# Patient Record
Sex: MALE | Race: WHITE | Employment: STUDENT | URBAN - METROPOLITAN AREA
[De-identification: names, ages, dates, MRNs, and addresses within clinical notes are randomized per-mention and may not be internally consistent; named-entity substitution may affect disease eponyms.]

---
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
## 2022-10-30 ENCOUNTER — APPOINTMENT (OUTPATIENT)
Dept: CT IMAGING | Age: 22
End: 2022-10-30
Payer: COMMERCIAL

## 2022-10-30 ENCOUNTER — HOSPITAL ENCOUNTER (EMERGENCY)
Age: 22
Discharge: HOME | End: 2022-10-30
Attending: STUDENT IN AN ORGANIZED HEALTH CARE EDUCATION/TRAINING PROGRAM
Payer: COMMERCIAL

## 2022-10-30 VITALS
SYSTOLIC BLOOD PRESSURE: 117 MMHG | HEART RATE: 50 BPM | OXYGEN SATURATION: 99 % | RESPIRATION RATE: 16 BRPM | WEIGHT: 160 LBS | BODY MASS INDEX: 22.9 KG/M2 | TEMPERATURE: 98 F | DIASTOLIC BLOOD PRESSURE: 71 MMHG | HEIGHT: 70 IN

## 2022-10-30 DIAGNOSIS — H53.9 VISUAL CHANGES: Primary | ICD-10-CM

## 2022-10-30 LAB
ANION GAP SERPL CALCULATED.3IONS-SCNC: 9 MMOL/L (ref 4–16)
BASOPHILS ABSOLUTE: 0.1 K/CU MM
BASOPHILS RELATIVE PERCENT: 0.8 % (ref 0–1)
BUN BLDV-MCNC: 13 MG/DL (ref 6–23)
CALCIUM SERPL-MCNC: 10 MG/DL (ref 8.3–10.6)
CHLORIDE BLD-SCNC: 101 MMOL/L (ref 99–110)
CHP ED QC CHECK: NORMAL
CO2: 29 MMOL/L (ref 21–32)
CREAT SERPL-MCNC: 1 MG/DL (ref 0.9–1.3)
DIFFERENTIAL TYPE: ABNORMAL
EOSINOPHILS ABSOLUTE: 0.7 K/CU MM
EOSINOPHILS RELATIVE PERCENT: 10.2 % (ref 0–3)
GFR SERPL CREATININE-BSD FRML MDRD: >60 ML/MIN/1.73M2
GLUCOSE BLD-MCNC: 103 MG/DL
GLUCOSE BLD-MCNC: 103 MG/DL (ref 70–99)
GLUCOSE BLD-MCNC: 106 MG/DL (ref 70–99)
HCT VFR BLD CALC: 48.4 % (ref 42–52)
HEMOGLOBIN: 16.1 GM/DL (ref 13.5–18)
IMMATURE NEUTROPHIL %: 0.2 % (ref 0–0.43)
LYMPHOCYTES ABSOLUTE: 2 K/CU MM
LYMPHOCYTES RELATIVE PERCENT: 31.6 % (ref 24–44)
MCH RBC QN AUTO: 29.1 PG (ref 27–31)
MCHC RBC AUTO-ENTMCNC: 33.3 % (ref 32–36)
MCV RBC AUTO: 87.4 FL (ref 78–100)
MONOCYTES ABSOLUTE: 0.6 K/CU MM
MONOCYTES RELATIVE PERCENT: 9.1 % (ref 0–4)
NUCLEATED RBC %: 0 %
PDW BLD-RTO: 12.5 % (ref 11.7–14.9)
PLATELET # BLD: 333 K/CU MM (ref 140–440)
PMV BLD AUTO: 9.1 FL (ref 7.5–11.1)
POTASSIUM SERPL-SCNC: 4.3 MMOL/L (ref 3.5–5.1)
RBC # BLD: 5.54 M/CU MM (ref 4.6–6.2)
SEGMENTED NEUTROPHILS ABSOLUTE COUNT: 3.1 K/CU MM
SEGMENTED NEUTROPHILS RELATIVE PERCENT: 48.1 % (ref 36–66)
SODIUM BLD-SCNC: 139 MMOL/L (ref 135–145)
TOTAL IMMATURE NEUTOROPHIL: 0.01 K/CU MM
TOTAL NUCLEATED RBC: 0 K/CU MM
WBC # BLD: 6.4 K/CU MM (ref 4–10.5)

## 2022-10-30 PROCEDURE — 85025 COMPLETE CBC W/AUTO DIFF WBC: CPT

## 2022-10-30 PROCEDURE — 80048 BASIC METABOLIC PNL TOTAL CA: CPT

## 2022-10-30 PROCEDURE — 70450 CT HEAD/BRAIN W/O DYE: CPT

## 2022-10-30 PROCEDURE — 6360000004 HC RX CONTRAST MEDICATION: Performed by: STUDENT IN AN ORGANIZED HEALTH CARE EDUCATION/TRAINING PROGRAM

## 2022-10-30 PROCEDURE — 82962 GLUCOSE BLOOD TEST: CPT

## 2022-10-30 PROCEDURE — 70496 CT ANGIOGRAPHY HEAD: CPT

## 2022-10-30 PROCEDURE — 99285 EMERGENCY DEPT VISIT HI MDM: CPT

## 2022-10-30 RX ADMIN — IOPAMIDOL 75 ML: 755 INJECTION, SOLUTION INTRAVENOUS at 13:09

## 2022-10-30 NOTE — ED PROVIDER NOTES
Emergency Department Encounter    Patient: Tonja Martines  MRN: [id number]  : 2000  Date of Evaluation: 10/30/2022  ED Provider:  Amelia Ragsdale DO    Triage Chief Complaint:   Loss of Vision (In rt eye since Monday 10/24/2022. Sent by urgent care)    [place]:  Tonja Martines is a 23 y.o. male with no significant past medical history that presents to the ED with a history of right eye vision loss. Patient said he happened on Monday while he was jogging and he had partial loss of vision in the right eye. Symptoms are resolved by the time patient comes to the ED. No history of headache, redness of the eye, trauma to the eye, eye discharge, pain in the eye, numbness, focal weakness. Patient does not have any symptom at this time. ROS - see HPI, below listed is current ROS at time of my eval:  General:  No fevers, no chills, no weakness  Eyes:  + recent vison changes, no discharge  ENT:  No sore throat, no nasal congestion, no hearing changes  Cardiovascular:  No chest pain, no palpitations  Respiratory:  No shortness of breath, no cough, no wheezing  Gastrointestinal:  No pain, no nausea, no vomiting, no diarrhea  Musculoskeletal:  No muscle pain, no joint pain  Skin:  No rash, no pruritis, no easy bruising  Neurologic:  No speech problems, no headache, no extremity numbness, no extremity tingling, no extremity weakness  Psychiatric:  No anxiety  Genitourinary:  No dysuria, no hematuria  Endocrine:  No unexpected weight gain, no unexpected weight loss  Extremities:  no edema, no pain    Past Medical History:   Diagnosis Date    Moderate persistent asthma     Seasonal allergies      History reviewed. No pertinent surgical history.   Family History   Problem Relation Age of Onset    High Cholesterol Father      Social History     Socioeconomic History    Marital status: Single     Spouse name: Not on file    Number of children: Not on file    Years of education: Not on file    Highest education level: Not on file   Occupational History    Not on file   Tobacco Use    Smoking status: Never    Smokeless tobacco: Never   Vaping Use    Vaping Use: Never used   Substance and Sexual Activity    Alcohol use: Not Currently    Drug use: Never    Sexual activity: Not Currently     Partners: Female   Other Topics Concern    Not on file   Social History Narrative    Not on file     Social Determinants of Health     Financial Resource Strain: Not on file   Food Insecurity: Not on file   Transportation Needs: Not on file   Physical Activity: Not on file   Stress: Not on file   Social Connections: Not on file   Intimate Partner Violence: Not on file   Housing Stability: Not on file     No current facility-administered medications for this encounter. Current Outpatient Medications   Medication Sig Dispense Refill    albuterol (PROVENTIL) (5 MG/ML) 0.5% nebulizer solution Take 2.5 mg by nebulization every 4-6 hours as needed      levalbuterol (XOPENEX HFA) 45 MCG/ACT inhaler Inhale 2 puffs into the lungs every 6 hours as needed      cetirizine (ZYRTEC) 10 MG tablet Take 10 mg by mouth daily      SYMBICORT 80-4.5 MCG/ACT AERO Take 2 puffs by mouth 2 times daily       Allergies   Allergen Reactions    Tree Nut [Macadamia Nut Oil] Other (See Comments)    Pcn [Penicillins]     [place] Oil     Amoxicillin-Pot Clavulanate     Peanut-Containing Drug Products Hives       Nursing Notes Reviewed    Physical Exam:  Triage VS:    ED Triage Vitals [10/30/22 1115]   Enc Vitals Group      /83      Heart Rate 58      Resp 16      Temp 98 °F (36.7 °C)      Temp Source Oral      SpO2 100 %      Weight 160 lb (72.6 kg)      Height 5' 10\" (1.778 m)      Head Circumference       Peak Flow       Pain Score       Pain Loc       Pain Edu? Excl. in [address]? My pulse ox interpretation is - normal    General appearance:  No acute distress. Skin:  Warm. Dry. Eye:  Extraocular movements intact.   No redness, swelling around the eyes, discharge, itchiness, tearing, or tenderness around the eyes. Visual field intact bilaterally, visual acuity 20/16 with both eyes, 20/20 right eye, 20/20 left eye. Ears, nose, mouth and throat:  Oral mucosa moist   Neck:  Trachea midline. Extremity:  No swelling. Normal ROM     Heart:  Regular rate and rhythm, normal S1 & S2, no extra heart sounds. Perfusion:  intact  Respiratory:  Lungs clear to auscultation bilaterally. Respirations nonlabored. Abdominal:  Normal bowel sounds. Soft. Nontender. Non distended. Back:  No CVA tenderness to palpation     Neurological:  Alert and oriented times 3. No focal neuro deficits.              Psychiatric:  Appropriate    I have reviewed and interpreted all of the currently available lab results from this visit (if applicable):  Results for orders placed or performed during the hospital encounter of 10/30/22   BMP   Result Value Ref Range    Sodium 139 135 - 145 MMOL/L    Potassium 4.3 3.5 - 5.1 MMOL/L    Chloride 101 99 - 110 mMol/L    CO2 29 21 - 32 MMOL/L    Anion Gap 9 4 - 16    BUN 13 6 - 23 MG/DL    Creatinine 1.0 0.9 - 1.3 MG/DL    Est, Glom Filt Rate >60 >60 mL/min/1.73m2    Glucose 106 (H) 70 - 99 MG/DL    Calcium 10.0 8.3 - 10.6 MG/DL   CBC with Auto Differential   Result Value Ref Range    WBC 6.4 4.0 - 10.5 K/CU MM    RBC 5.54 4.6 - 6.2 M/CU MM    Hemoglobin 16.1 13.5 - 18.0 GM/DL    Hematocrit 48.4 42 - 52 %    MCV 87.4 78 - 100 FL    MCH 29.1 27 - 31 PG    MCHC 33.3 32.0 - 36.0 %    RDW 12.5 11.7 - 14.9 %    Platelets 388 602 - 795 K/CU MM    MPV 9.1 7.5 - 11.1 FL    Differential Type AUTOMATED DIFFERENTIAL     Segs Relative 48.1 36 - 66 %    Lymphocytes % 31.6 24 - 44 %    Monocytes % 9.1 (H) 0 - 4 %    Eosinophils % 10.2 (H) 0 - 3 %    Basophils % 0.8 0 - 1 %    Segs Absolute 3.1 K/CU MM    Lymphocytes Absolute 2.0 K/CU MM    Monocytes Absolute 0.6 K/CU MM    Eosinophils Absolute 0.7 K/CU MM    Basophils Absolute 0.1 K/CU MM Nucleated RBC % 0.0 %    Total Nucleated RBC 0.0 K/CU MM    Total Immature Neutrophil 0.01 K/CU MM    Immature Neutrophil % 0.2 0 - 0.43 %   POC Blood Glucose   Result Value Ref Range    Glucose 103 mg/dL    QC OK? ok    POCT Glucose   Result Value Ref Range    POC Glucose 103 (H) 70 - 99 MG/DL      Radiographs (if obtained):  Radiologist's Report Reviewed:  No results found. EKG (if obtained): (All EKG's are interpreted by myself in the absence of a cardiologist)      MDM:  22-year-old male presenting to the ED with a history of intermittent right vision loss since  Monday. Has been improving since then. Patient went to urgent care and was advised to come to the ED for CT scan     Physical examination unremarkable. Visual field negative  Visual acuity 20/16 bilateral, 20/20 right eye 20/20 left eye    Patient scheduled for CT scan    2:09 PM  Patient CT scan of the head and CT angiogram of the head and neck are unremarkable. Patient discharged and advised to follow-up with primary care provider and also given ophthalmology outpatient referral.  Patient and mother who is by the bedside verbalized their understanding and agreement with the plan. Clinical Impression:  1.  Visual changes      Disposition referral (if applicable):  RAE Mcbride - CNP  [address] [facility] [address] [facility]  [phone]    Schedule an appointment as soon as possible for a visit in 3 days      Bobbi Shi, [address]  [phone]    Schedule an appointment as soon as possible for a visit in 3 days    Disposition medications (if applicable):  New Prescriptions    No medications on file     ED Provider Disposition Time  DISPOSITION Decision To Discharge 10/30/2022 02:12:20 PM      Comment: Please note this report has been produced using speech recognition software and may contain errors related to that system including errors in grammar, punctuation, and spelling, as well as words and phrases that may be inappropriate. Efforts were made to edit the dictations.        [address] [facility],[address], DO  11/08/22 8037

## 2022-10-30 NOTE — DISCHARGE INSTRUCTIONS
Follow-up with primary care in the next 1 week  Follow-up with ophthalmology per referral  Return to the ED if you have any worsening visual changes, headache, or any other symptom of concern.

## 2023-01-25 ENCOUNTER — HOSPITAL ENCOUNTER (OUTPATIENT)
Dept: PHYSICAL THERAPY | Age: 23
Setting detail: THERAPIES SERIES
Discharge: HOME | End: 2023-01-25
Payer: COMMERCIAL

## 2023-01-25 PROCEDURE — 97110 THERAPEUTIC EXERCISES: CPT

## 2023-01-25 PROCEDURE — 97161 PT EVAL LOW COMPLEX 20 MIN: CPT

## 2023-01-25 NOTE — FLOWSHEET NOTE
[facility]) Physical Therapy at [address], [facility], [address]  Phone: [phone]   Fax:[phone]         Physical Therapy Treatment Note  Date:  2023    Patient Name:  Homar Alvarado      :      MRN: [id number]    Diagnosis: R distal hamstring strain    Referring provider:  Dr Lorence Klinefelter  Next provider Visit:       Insurance/Certification information:   Plan of care signed (Y/N): Pending  Changes to ambulatory summary sheet? 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 Pain, did not finish the race. Has been working with AT since then. Rest, ES, heat/compression. No stretching. No activity at practice. Subjective/Pain:    musculotendinous junction of bicep femoris, tight sore pain. No pain at total rest.  Pain with walking 1-2/10 tight at heel strike.  50% of the day     Aggrevators: heel strike, stretching , active knee flexion               Relievers: rest    Objective: see eval    Exercises: R distal ham strain 23    Hamstring acute phase 23     Goals: protect scar tissue, minimize atrophy, ctrl pain Visit #1 Visit# Visit#    = 1wk     WarmUp      TM at 5% to avoid ESH Eval today                 Exercises       SL stance microbend bosu 30 deg flexion     PPT with towel hold      PPT with towel hold march      SL hip abduction      Prone hip ext (ham curls when painfree)      Prone Hip ext + knee flexion Not yet     DL bridges       Ball plank      Ball Ab crunch      Supine ham stretches ESH<45            Strength      Isometric Ham at 45 MF  L=53.9 R=10.1                 Goals to move to phase 2:      Neutral spine with there ex      No pain      Iso MF hams at 45>50% contral         Home Exercises Given: rest, no stretching    Assessment:  still has pain at heel strike with normal gait flat surface    Treatment/Activity Tolerance:  [x] Patient tolerated treatment well [] Patient limited by fatigue  [] Patient limited by pain  [] Patient limited by other medical complications  [] Other:     Plan: follow protocol attached to eval    Time In/Time Out:   8490-0719                     Timed Code/Total Treatment Minutes:  Eval low TE1    Electronically signed by:

## 2023-01-25 NOTE — PROGRESS NOTES
[facility] at [place] [address]  Fax [phone]     Dorina Renee PT,ATC Phone: [phone]                                      To: Dr Thakur         From: Jane Renee, PT,  ATC      Patient: Ye Benjamin                    : 2000    Diagnosis:      Date: 2023   Initial PT Evaluation/POC                   Evaluation Date:  23        Total Visits to date:        Outcome Measure:                      Initial Score:    Discharge Score:             Sport:  track, bebeto, 100 and 200  Patient Goal:  I'd like to run in conference meet , full practice   Patient History/Mechanism of Injury: running 4x200 relay on 2023 and felt hamstring pop on the turn.  Pain, did not finish the race. Has been working with AT since then. Rest, ES, heat/compression. No stretching. No activity at practice.    Symptoms: musculotendinous junction of bicep femoris, tight sore pain. No pain at total rest.  Pain with walking 1-2/10 tight at heel strike. 50% of the day    Aggrevators: heel strike, stretching , active knee flexion               Relievers: rest    Objective/Significant Findings + Goals     24 Goals TBA prior to DC to Bridge   Post op Week      Outcome IKDC  >90% indicating psychological readiness to compete   Brace Wearing compression sleeve Fittend for functional brace   Pain 2/10 No pain   Gait Pain at heel strike Normal walking/jogging gait on flat and uneven surfaces at endurance and sprint speeds   Quad Tone Excellent bilateral Excellent with noted VMO fully engaged   MF hamstrings prone at 45 flexion L=53.9 R=10.1    Lateral Step Down timed NT 30 reps in 30 sec 8in step   Single Leg Squat  NT To 70deg x30 reps in 30sec   Proprioception SL Bosu Tossup L=2  R=1 X30 to ceiling, eyes on ball    SL Reach  NT 24in   SL Calf Raises 30 bilateral MET   SL Horizontal Hop NT Jump=height of athlete   SL Triple Crossover  NT  = to nonsurgical extremity   SL 20yd hop for time NT <6sec bilateral   SL Seated Press NT 70% BW x10   SL Seated Quad Extension L=130# R=50# #115/#130   SL Prone Hamstring Curl L=50#x10 R=NT 90#   Quad / Hamstring ratio  >60%female  >70%male         . cady  Goal Status: [] Achieved [] Partially Achieved  [x] Not Achieved, established today     Assessment:  Rehab Potential:   [x] Excellent [] Good [] Fair  [] Poor   Education on: protocol below    Plan: Will see 2X per week for 4 weeks then re-assess.  Move through below protocol as tolerated   Hamstring acute phase   Goals: protect scar tissue, minimize atrophy, ctrl pain      WarmUp   TM at 5% to avoid ESH         Exercises    SL stance microbend bosu   PPT with towel hold   PPT with towel hold march   SL hip abduction   Prone hip ext (ham curls when painfree)   Prone Hip ext + knee flexion   DL bridges    Ball plank   Ball Ab crunch   Supine ham stretches ESH<45      Strength   Isometric Ham at 45 MF          Goals to move to phase 2:   Neutral spine with there ex   No pain   Iso MF hams at 45>50% contral     Hamstring subacute phase   Goals: painfree ham strength mid range, no pain sprints, ctrl trunk      WarmUp   TM to 70% speed 3% incl   ESH<70 no pain      Exercises    SL stance bosu runners pose   Block drop hop to blue bosu DL   Star drill SL front, back + lateral   DL deep sit/squat on bosu hold   Plank UE on bosu, foot on ball while other leg \"runs\"   Lunge + PPT   Lunge to blue bosu PPT   Ball plank   Bridges injured leg on box   Supine ham stretches ESH<70 no pain   Sorinex bkwd glides front heel, back leg slide   Nordic hams 2sets 5 reps   Supine ham stretches ESH<70 no pain   Hip flexor in jerri test position +PPT   Calf stretching   Strength   Isometric Ham at 90 30 and 0 MF    Supine MF hip at 90, knee at 30 + 90      Goals to move to phase 3:   Neutral spine with there ex   No pain   Iso MF hams hip at 45 knee at 25 <10% def   Equal active hip flexion   Neg modified jerri   Deep squat test    In line lunge test       Hamstring functional phase   Goals: return to sport      WarmUp   Max speed flat   Progress to neg slope   No ESH limit   Exercises   Star drill on bosu inc speed+res   Lateral lunges to bosu + catch   SL excursions   Ball on wall, knee flexion   Plank on ball and bosu two unstable points   Lunge + PPT with pulleys   Stool pull   SL sit to 21in   High speed knee flex over bar + tB   Supine ham stretches no limit         Strength   Full functional strength test                 Physical Therapy Certification/Re-Certification Form    Dr Nia Christie   The following patient has been evaluated for physical therapy services and for therapy to continue, insurance requires physician review of the treatment plan initially and every 90 days. Please review the attached evaluation and/or summary of the patient's plan of care, and verify that you agree therapy should continue by signing the attached document and sending it back to our office. Patient agrees with established plan of care and assisted in the development of their short term and long term goals. Patient had no adverse reaction with initial treatment and there are no barriers to learning. Demonstrates no mental or cognitive disorder. Patient reports they learn best through demonstration. 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 If we are requesting more visits, we fully anticipate the patient's condition is expected to improve within the treatment timeframe we are requesting. Patient Status:          __X__Initial POC     _____Re-Assessment, recommend additional visits     _____Hold therapy for MD visit     _____Discharge      Electronically signed by:  Duane Negron PT, 1/25/2023, 11:05 AM    If you have any questions or concerns, please don't hesitate to call.  Thank you for your referral.    Physician Signature:__________________________________ Date:______ Time: ________  By signing above, therapists plan is approved by physician

## 2023-01-27 ENCOUNTER — HOSPITAL ENCOUNTER (OUTPATIENT)
Dept: PHYSICAL THERAPY | Age: 23
Setting detail: THERAPIES SERIES
Discharge: HOME | End: 2023-01-27
Payer: COMMERCIAL

## 2023-01-27 PROCEDURE — 97110 THERAPEUTIC EXERCISES: CPT

## 2023-01-27 PROCEDURE — 97112 NEUROMUSCULAR REEDUCATION: CPT

## 2023-01-27 NOTE — FLOWSHEET NOTE
[facility]) Physical Therapy at [address] [place], [address]  Phone: [phone]   Fax:[phone]         Physical Therapy Treatment Note  Date:  2023    Patient Name:  Danis Song      :  [birth year]    MRN: [id number]    Diagnosis: R distal hamstring strain    Referring provider:  Dr Rachel Mendoza  Next provider Visit:       Insurance/Certification information:   Plan of care signed (Y/N): Pending  Changes to ambulatory summary sheet? 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 Pain, did not finish the race. Has been working with AT since then. Rest, ES, heat/compression. No stretching. No activity at practice. Subjective/Pain:    No pain at the start of the session. No pain reported at heel strike     Aggrevators: heel strike, stretching , active knee flexion               Relievers: rest    Objective: see eval    Exercises: R distal ham strain 23    Hamstring acute phase 23    Goals: protect scar tissue, minimize atrophy, ctrl pain Visit #1 Visit#2 Visit#    = 1wk =1wk    WarmUp      TM at 5% to avoid [place]rer[place] Eval today 5'/2. 5mph 5% incline                Exercises       SL stance microbend bosu 30 deg flexion 2x30\" bilat    PPT with towel hold  X3 10\"    PPT with towel hold march  2x10 alt    SL hip abduction  2x10 bilat     Prone hip ext (ham curls when painfree)  X10 ham curl bilat  2x10 bilat    Prone Hip ext + knee flexion Not yet 2x10 bilat    DL bridges   X10, x15    Ball plank  Thighs on ball 60\" x2    Ball Ab crunch  7# DB x30    Supine ham stretches ESH<45  30\"          Strength      Isometric Ham at 45 MF  L=53.9 R=10.1                 Goals to move to phase 2:      Neutral spine with there ex      No pain      Iso MF hams at 45>50% contral         Home Exercises Given: rest, no stretching    Assessment:  0 pain at heel strike with normal gait. Some bruising on the proximal calf, starting to heal. No pain with exercises.     Treatment/Activity Tolerance:  [x] Patient tolerated treatment well [] Patient limited by fatigue  [] Patient limited by pain  [] Patient limited by other medical complications  [] Other:     Plan: follow protocol attached to eval    Time In/Time Out:   2502-8475                     Timed Code/Total Treatment Minutes:  2 TE 1 NEURO    Electronically signed by:  Jose Alcocer, PT, DPT, CSCS

## 2023-01-31 ENCOUNTER — HOSPITAL ENCOUNTER (OUTPATIENT)
Dept: PHYSICAL THERAPY | Age: 23
Setting detail: THERAPIES SERIES
Discharge: HOME | End: 2023-01-31
Payer: COMMERCIAL

## 2023-01-31 PROCEDURE — 97112 NEUROMUSCULAR REEDUCATION: CPT

## 2023-01-31 PROCEDURE — 97110 THERAPEUTIC EXERCISES: CPT

## 2023-01-31 PROCEDURE — 97530 THERAPEUTIC ACTIVITIES: CPT

## 2023-01-31 NOTE — FLOWSHEET NOTE
[facility]) Physical Therapy at [address], [address]  Phone: [phone]   Fax:[phone]         Physical Therapy Treatment Note  Date:  2023    Patient Name:  Terra Melnedrez      :  [birth year]    MRN: [id number]    Diagnosis: R distal hamstring strain    Referring provider:  Dr Fry  Next provider Visit:       Insurance/Certification information:   Plan of care signed (Y/N): Pending  Changes to ambulatory summary sheet? 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 Pain, did not finish the race. Has been working with AT since then. Rest, ES, heat/compression. No stretching. No activity at practice. Subjective/Pain:  No pain with walking. Sometimes with \"weird mvmts\" can feel hamstring lateral mid belly to distal 4-5/10 2x per day     Aggrevators: heel strike, stretching , active knee flexion               Relievers: rest    Objective: per eval, poor quad/ham ratio. Palpation: pain at distal musculotendinous junction BF     Exercises: R distal ham strain 23    Hamstring acute phase 23   Goals: protect scar tissue, minimize atrophy, ctrl pain Visit #1 Visit#2 Visit#    = 1wk =1wk =2wks   WarmUp      TM at 5% to avoid [place]rer[place] Eval today 5'/2. 5mph 5% incline 5'/2. 5mph 5% incline               Exercises       SL stance microbend bosu 30 deg flexion 2x30\" bilat SL black bosu holding MB 60sec chelo   PPT with towel hold  X3 10\" x30   PPT with towel hold march  2x10 alt X15 chelo   SL hip abduction  2x10 bilat  2# at wall 2x15 chelo   Prone hamstring curls  X10 ham curl bilat  2x10 bilat x30   Prone Hip ext + knee flexion Not yet 2x10 bilat x30   DL bridges   X10, x15    Ball plank  Thighs on ball 60\" x2 [id number]   Ball Ab crunch  7# DB x30 7# x30   Supine ham stretches ESH<45  30\" [id number]         Strength      Isometric Ham at 45 MF  L=53.9 R=10.1  L=53  R=30               Goals to move to phase 2:      Neutral spine with there ex   Not met   No pain   Not met   Iso MF hams at 45>50% contral   MET      Home Exercises Given: as above    Assessment:  0 pain at heel strike with normal gait. Some bruising on the proximal calf, starting to heal. No pain with exercises. Has 2-5/54 pain with certain movements 2x/day.   Strength goal met for phase one, will advance to phase two when all pain is gone and no pain with palpation    Treatment/Activity Tolerance:  [x] Patient tolerated treatment well [] Patient limited by fatigue  [] Patient limited by pain  [] Patient limited by other medical complications  [] Other:     Plan: follow protocol attached to eval    Time In/Time Out:  815-900                    Timed Code/Total Treatment Minutes:  1TE 1 N 1TA    Electronically signed by:

## 2023-02-03 ENCOUNTER — HOSPITAL ENCOUNTER (OUTPATIENT)
Dept: PHYSICAL THERAPY | Age: 23
Setting detail: THERAPIES SERIES
Discharge: HOME | End: 2023-02-03
Payer: COMMERCIAL

## 2023-02-03 PROCEDURE — 97530 THERAPEUTIC ACTIVITIES: CPT

## 2023-02-03 PROCEDURE — 97110 THERAPEUTIC EXERCISES: CPT

## 2023-02-03 PROCEDURE — 97112 NEUROMUSCULAR REEDUCATION: CPT

## 2023-02-03 NOTE — FLOWSHEET NOTE
[facility]) Physical Therapy at [address] [place], [address]  Phone: [phone]   Fax:[phone]         Physical Therapy Treatment Note  Date:  2/3/2023    Patient Name:  Terra Melendrez      :  [birth year]    MRN: [id number]    Diagnosis: R distal hamstring strain    Referring provider:  Dr Fry  Next provider Visit:       Insurance/Certification information:   Plan of care signed (Y/N): Pending  Changes to ambulatory summary sheet? 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 Pain, did not finish the race. Has been working with AT since then. Rest, ES, heat/compression. No stretching. No activity at practice. Subjective/Pain:  No pain with walking. A couple times a day pain with weird movement (4-5/10). Aggrevators: heel strike, stretching , active knee flexion               Relievers: rest    Objective: per eval, poor quad/ham ratio. Palpation: no significant pain at BF junction, ongoing healing of bruise, much better this week     Exercises: R distal ham strain 23    Hamstring acute phase 01-25-23 1/27/23 01-31-23 2/3/23   Goals: protect scar tissue, minimize atrophy, ctrl pain Visit #1 Visit#2 Visit# Visit #4    = 1wk =1wk =2wks =2wks   WarmUp       TM at 5% to avoid [place] Eval today 5'/2. 5mph 5% incline 5'/2. 5mph 5% incline 5', 2/5mph 5% incline                 Exercises        SL stance microbend bosu 30 deg flexion 2x30\" bilat SL black bosu holding MB 60sec chelo SL 30 deg bend MB chops   PPT with towel hold  X3 10\" x30 X30   PPT with towel hold march  2x10 alt X15 chelo X20 alt   SL hip abduction  2x10 bilat  2# at wall 2x15 chelo 2# at wall x30 bilat, told to slow down   Prone hamstring curls  X10 ham curl bilat  2x10 bilat x30 X30 4\" ecc   Prone Hip ext + knee flexion Not yet 2x10 bilat x30 x30   DL bridges   X10, x15  SL 2x15 ea side   Ball plank  Thighs on ball 60\" x2 [id number] 60\" x2   Ball Ab crunch  7# DB x30 7# x30 7# x30   Supine ham stretches ESH<45  30\" [id number] 30\" x3 sec           Strength       Isometric Ham at 45 MF  L=53.9 R=10.1  L=53  R=30 L= 54  R= 34  No pain                 Goals to move to phase 2:       Neutral spine with there ex   Not met Improving, not MET   No pain   Not met Not met, improving with palpation   Iso MF hams at 45>50% contral   MET MET      Home Exercises Given: as above    Assessment:  0 pain at heel strike with normal gait. Some bruising on the proximal calf, starting to heal. No pain with exercises. Has 8-1/58 pain with certain movements 2x/day.   Strength goal met for phase one, will advance to phase two when all pain is gone and no pain with palpation    Treatment/Activity Tolerance:  [x] Patient tolerated treatment well [] Patient limited by fatigue  [] Patient limited by pain  [] Patient limited by other medical complications  [] Other:     Plan: follow protocol attached to eval    Time In/Time Out:  7614-8780                    Timed Code/Total Treatment Minutes:  1TE 1 N 1TA    Electronically signed by:    Tessie Dodd, PT, DPT, CSCS

## 2023-02-07 ENCOUNTER — HOSPITAL ENCOUNTER (OUTPATIENT)
Dept: PHYSICAL THERAPY | Age: 23
Setting detail: THERAPIES SERIES
Discharge: HOME | End: 2023-02-07
Payer: COMMERCIAL

## 2023-02-07 PROCEDURE — 97110 THERAPEUTIC EXERCISES: CPT

## 2023-02-07 PROCEDURE — 97112 NEUROMUSCULAR REEDUCATION: CPT

## 2023-02-07 PROCEDURE — 97530 THERAPEUTIC ACTIVITIES: CPT

## 2023-02-07 NOTE — FLOWSHEET NOTE
[facility]) Physical Therapy at [address] [place], [address]  Phone: [phone]   Fax:[phone]         Physical Therapy Treatment Note  Date:  2023    Patient Name:  Hector Caal      :      MRN: [id number]    Diagnosis: R distal hamstring strain    Referring provider:  Dr Mardy Seip  Next provider Visit:       Insurance/Certification information:   Plan of care signed (Y/N): Pending  Changes to ambulatory summary sheet? 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 Pain, did not finish the race. Has been working with AT since then. Rest, ES, heat/compression. No stretching. No activity at practice. Subjective/Pain:  No pain since last visit. Move to PHASE TWO     Aggrevators: heel strike, stretching , active knee flexion               Relievers: rest    Objective: per eval, poor quad/ham ratio. Palpation: no significant pain at BF junction, ongoing healing of bruise, much better this week     Exercises: R distal ham strain 23    Hamstring acute phase 01-31-23 2/3/23 02-07-23   Goals: protect scar tissue, minimize atrophy, ctrl pain Visit# Visit #4 Visit#5    2=2wks =2wks =3wks   WarmUp      TM at 5% to avoid ESH 5'/2. 5mph 5% incline 5', 2/5mph 5% incline 5', 2/5mph 5% incline      Jog 300 at 50% speed no pain         Exercises       SL stance microbend bosu SL black bosu holding MB 60sec chelo SL 30 deg bend MB chops SL blue bosu x60sec   PPT with towel hold x30 X30 NT   PPT with towel hold march X15 chelo X20 alt x20   SL hip abduction 2# at wall 2x15 chelo 2# at wall x30 bilat, told to slow down 2# at wall x30   Prone hamstring curls x30 X30 4\" ecc X30 bilateral   Prone Hip ext + knee flexion x30 x30 x30   DL bridges   SL 2x15 ea side 2x15   Lunge + PPT   In doorway x15 bilateral   Star drill    X10 bilateral   Ball plank [id number] 60\" x2 Out to knees [id number]   Ball Ab crunch 7# x30 7# x30 7# x30   Supine ham stretches ESH<45 2o73dni 30\" x3 sec  ESH <70 0e65ipm         Strength      Isometric Ham at 45 MF  L=53  R=30 L= 54  R= 34  No pain NT               Goals to move to phase 2:      Neutral spine with there ex Not met Improving, not MET MET   No pain Not met Not met, improving with palpation MET   Iso MF hams at 45>50% contral MET MET MET      Home Exercises Given: as above    Assessment:  phase two    Treatment/Activity Tolerance:  [x] Patient tolerated treatment well [] Patient limited by fatigue  [] Patient limited by pain  [] Patient limited by other medical complications  [] Other:     Plan: follow protocol attached to eval    Time In/Time Out:  4342-8875                    Timed Code/Total Treatment Minutes:  1TE 1 N 1TA    Electronically signed by:

## 2023-02-10 ENCOUNTER — HOSPITAL ENCOUNTER (OUTPATIENT)
Dept: PHYSICAL THERAPY | Age: 23
Setting detail: THERAPIES SERIES
Discharge: HOME | End: 2023-02-10
Payer: COMMERCIAL

## 2023-02-10 PROCEDURE — 97112 NEUROMUSCULAR REEDUCATION: CPT

## 2023-02-10 PROCEDURE — 97110 THERAPEUTIC EXERCISES: CPT

## 2023-02-10 PROCEDURE — 97530 THERAPEUTIC ACTIVITIES: CPT

## 2023-02-10 NOTE — FLOWSHEET NOTE
[facility]) Physical Therapy at [address] [place], [address]  Phone: [phone]   Fax:[phone]         Physical Therapy Treatment Note  Date:  2/10/2023    Patient Name:  Patricia Amaya      :  [birth year]    MRN: [id number]    Diagnosis: R distal hamstring strain    Referring provider:  Dr Keo Simeon  Next provider Visit:       Insurance/Certification information:   Plan of care signed (Y/N): Pending  Changes to ambulatory summary sheet? 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 Pain, did not finish the race. Has been working with AT since then. Rest, ES, heat/compression. No stretching. No activity at practice. Subjective/Pain:  No pain since last visit. Move to PHASE TWO. No hamstring pain since last visit     Aggrevators: heel strike, stretching , active knee flexion               Relievers: rest    Objective: per eval, poor quad/ham ratio. Palpation: no significant pain at BF junction, ongoing healing of bruise, much better this week     Exercises: R distal ham strain 23    Hamstring acute phase 01-31-23 2/3/23 02-07-23 02-10-23   Goals: protect scar tissue, minimize atrophy, ctrl pain Visit# Visit #4 Visit#5 Visit#6    2/4=2wks 2/=2wks =3wks =3wks   WarmUp       TM at 5% to avoid ESH 5'/2. 5mph 5% incline 5', 2/5mph 5% incline 5', 2/5mph 5% incline 5', 2/5mph 5% incline      Jog 300 at 50% speed no pain 300 in 71sec  No pain, ham has tired feeling          Exercises        SL stance microbend bosu SL black bosu holding MB 60sec chelo SL 30 deg bend MB chops SL blue bosu x60sec SL blue bosu x60sec   PPT with towel hold x30 X30 NT x30   PPT with towel hold march X15 chelo X20 alt x20 x20   SL hip abduction 2# at wall 2x15 chelo 2# at wall x30 bilat, told to slow down 2# at wall x30 3# 2x15   Prone hamstring curls x30 X30 4\" ecc X30 bilateral 30# ecc lowering x10   Prone Hip ext + knee flexion x30 x30 x30 Hip ext L=70# R=50# x15   DL bridges   SL 2x15 ea side 2x15 2x15 on sorinex    Lunge + PPT   In doorway x15 bilateral +1airex x20 chelo 9#bar   SL sit     +2airex 2x15   Star drill    X10 bilateral Bkwd excursion on sorinex x20   Ball plank 7x41ety 60\" x2 Out to knees [id number] Out to shins [id number]   Ball Ab crunch 7# x30 7# x30 7# x30 8# x30   Supine ham stretches ESH<45 2f91qxu 30\" x3 sec  ESH <70 5z23uim ESH <70 0s69pkj          Strength       Isometric Ham at 45 MF  L=53  R=30 L= 54  R= 34  No pain NT Text next rx                 Goals to move to phase 3:       Neutral spine with there ex Not met Improving, not MET MET    No pain Not met Not met, improving with palpation      MET MET        Home Exercises Given: as above    Assessment:  phase two    Treatment/Activity Tolerance:  [x] Patient tolerated treatment well [] Patient limited by fatigue  [] Patient limited by pain  [] Patient limited by other medical complications  [] Other:     Plan: follow protocol attached to eval    Time In/Time Out:  0557-0183                    Timed Code/Total Treatment Minutes:  1TE 1 N 1TA    Electronically signed by:

## 2023-02-12 NOTE — FLOWSHEET NOTE
[facility]) Physical Therapy at [address] [place], [address]  Phone: [phone]   Fax:[phone]         Physical Therapy Treatment Note  Date:  2023    Patient Name:  Amy Lamb      :      MRN: [id number]    Diagnosis: R distal hamstring strain    Referring provider:  Dr Nia Christie  Next provider Visit:       Insurance/Certification information:   Plan of care signed (Y/N): Pending  Changes to ambulatory summary sheet? 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 Pain, did not finish the race. Has been working with AT since then. Rest, ES, heat/compression. No stretching. No activity at practice. Subjective/Pain:  No pain since last visit. Move to PHASE TWO. No hamstring pain since last visit     Aggrevators: heel strike, stretching , active knee flexion               Relievers: rest    Objective: per eval, poor quad/ham ratio. Palpation: no significant pain at BF junction, ongoing healing of bruise, much better this week  300 in 71sec, no pain but hams feel \"tired\"    Exercises: R distal ham strain 23. Goal=back to training by end of Feb  Home running: 3 times per week on TM 5# incline sprint/walk ratio    Hamstring subacute phase 23 #7    Goals: painfree ham strength mid range, no pain sprints, ctrl trunk =4wks          WarmUp     TM up to 70% speed 3% incl . 1walk/.1run  8mph  9mph  10mph    ESH<70 no pain (seated on quad machine) L=30   R=27          Exercises      SL stance bosu runners pose [id number]    Block drop hop to blue bosu DL x15    Star drill SL front, back + lateral Sorinex back fwd +lat x10 chelo    DL deep sit/squat on bosu hold [id number]    Plank UE on bosu, foot on ball while other leg \"runs\" x30    Lunge + PPT 9#bar not in door +1airex x20 chelo    Lunge to blue bosu PPT NT    Ball plank NT    Bridges SL on bosu 2x15    TRX plank with SL running 20 each side    TRX reverse plank NT    Ross hams 2sets 5 reps     Supine ham stretches ESH<70 no pain [id number]    Hip flexor in jerri test position +PPT 3 x30    Calf stretching 3x30    Strength     Isometric Ham at 90 30 and 0 MF      Supine MF hip at 90, knee at 30 + 90           Goals to move to phase 3:     Neutral spine with there ex     No pain     Iso MF hams hip at 45 knee at 25 <10% def     Equal active hip flexion     Neg modified jerri     Deep squat test      In line lunge test         Home Exercises Given: as above    Assessment:  phase two    Treatment/Activity Tolerance:  [x] Patient tolerated treatment well [] Patient limited by fatigue  [] Patient limited by pain  [] Patient limited by other medical complications  [] Other:     Plan: follow protocol attached to eval    Time In/Time Out:  7924-8334                    Timed Code/Total Treatment Minutes:  1TE 1 N 1TA    Electronically signed by:

## 2023-02-14 ENCOUNTER — HOSPITAL ENCOUNTER (OUTPATIENT)
Dept: PHYSICAL THERAPY | Age: 23
Setting detail: THERAPIES SERIES
Discharge: HOME | End: 2023-02-14
Payer: COMMERCIAL

## 2023-02-14 PROCEDURE — 97530 THERAPEUTIC ACTIVITIES: CPT

## 2023-02-14 PROCEDURE — 97110 THERAPEUTIC EXERCISES: CPT

## 2023-02-14 PROCEDURE — 97112 NEUROMUSCULAR REEDUCATION: CPT

## 2023-02-15 NOTE — FLOWSHEET NOTE
[facility]) Physical Therapy at [address] [place], [address]  Phone: [phone]   Fax:[phone]         Physical Therapy Treatment Note  Date:  2/15/2023    Patient Name:  Joan Frederick      :  [birth year]    MRN: [id number]    Diagnosis: R distal hamstring strain    Referring provider:  Dr Chasity Fu  Next provider Visit:       Insurance/Certification information:   Plan of care signed (Y/N): Pending  Changes to ambulatory summary sheet? 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 Pain, did not finish the race. Has been working with AT since then. Rest, ES, heat/compression. No stretching. No activity at practice. Subjective/Pain:  No pain since last visit. Move to PHASE TWO. No hamstring pain since last visit     Aggrevators: heel strike, stretching , active knee flexion               Relievers: rest    Objective: per eval, poor quad/ham ratio. Palpation: no significant pain at BF junction, ongoing healing of bruise, much better this week  300 in 71sec, no pain but hams feel \"tired\"    Exercises: R distal ham strain 23. Goal=back to training by end of Feb.  Will test  for possible return to track  Home running: 3 times per week on TM 5# incline sprint/walk ratio    Hamstring subacute phase 23 #7 23 #8    Goals: painfree ham strength mid range, no pain sprints, ctrl trunk =4wks =4wks           WarmUp      TM up to 70% speed 3% incl . 1walk/.1run  8mph  9mph  10mph X6sets with slow jog recover     ESH<70 no pain (seated on quad machine) L=30   R=27 Test again next rx           Exercises      On the track: (ball, stool  One lap warm up    Walking lunges for speed  Next rx    Heel on ball hamstrings high speed SL Next rx    Split squat hops x15 Using 6in step behind x20 chelo    Deceleration drill   Next rx    Stool pull on track DL 1 lap     Self propelled hamstrings SL? Try stool vs sorinex? Next rx    Star drill SL front, back + lateral Sorinex back fwd +lat x10 chelo Sorinex back fwd +lat x10 chelo    DL deep sit/squat on bosu hold 4m89pia SL bosu with ham ball hold 60sec bilateral    Plank UE on bosu, foot on ball while other leg \"runs\" x30 X15 bilateral    Lunge + PPT 9#bar not in door +1airex x20 chelo On turf    Lunge to blue bosu PPT NT     Ball plank NT     Bridges SL on bosu 2x15     TRX plank with SL running 20 each side     TRX reverse plank NT     B[place]well hams 2sets 5 reps  2 sets 5 reps knees on airex    Supine ham stretches ESH<70 no pain 2h46fye     Hip flexor in jerri test position +PPT 3 x30     Calf stretching 3x30     Strength      Isometric Ham at 90 30 and 0 MF       Supine MF hip at 90, knee at 30 + 90             Goals to move to phase 3:      Neutral spine with there ex      No pain      Iso MF hams hip at 45 knee at 25 <10% def      Equal active hip flexion      Neg modified jerri      Deep squat test       In line lunge test          Home Exercises Given: as below    Warm up with walking, cycling or slow jog so that you break a sweat before starting your running program.  Then do dynamic stretching. After completing the running drills, do your static stretching routine. Ice for 20 min after.     Do not progress to the next step until the present step is painfree    Day 1  Run 1/2 speed 100yds, 10 reps (2-17-23, times 16-19sec)  Day 2  No run  Day 3  Repeat Day 1  Day 4  No run  Day 5  Repeat Day   Day 6  Run 3/4 speed 100 yds, 10 reps  Day 7  No run  Day 8  Repeat Day 6  Day 9  No run  Day 10  Repeat Day 6  Day 11  No run  Day 12 Run 1/2 speed 110 yds x3, run 3/4 speed 100 yds x3, run full speed 50yds x4  Day 13  No run  Day 14 Repeat Day 12 but add one additional 50 yd sprint, progress every other day until you can do 50yds sprints at full speed x10 painfree   Assessment:  phase two    Treatment/Activity Tolerance:  [x] Patient tolerated treatment well [] Patient limited by fatigue  [] Patient limited by pain  [] Patient limited by other medical complications  [] Other:     Plan: follow protocol attached to eval    Time In/Time Out:  7036-2531                  Timed Code/Total Treatment Minutes:  1TE 1 N 1TA    Electronically signed by:

## 2023-02-17 ENCOUNTER — HOSPITAL ENCOUNTER (OUTPATIENT)
Dept: PHYSICAL THERAPY | Age: 23
Setting detail: THERAPIES SERIES
Discharge: HOME | End: 2023-02-17
Payer: COMMERCIAL

## 2023-02-17 PROCEDURE — 97110 THERAPEUTIC EXERCISES: CPT

## 2023-02-17 PROCEDURE — 97530 THERAPEUTIC ACTIVITIES: CPT

## 2023-02-17 PROCEDURE — 97112 NEUROMUSCULAR REEDUCATION: CPT

## 2023-02-21 ENCOUNTER — HOSPITAL ENCOUNTER (OUTPATIENT)
Dept: PHYSICAL THERAPY | Age: 23
Setting detail: THERAPIES SERIES
Discharge: HOME | End: 2023-02-21
Payer: COMMERCIAL

## 2023-02-21 PROCEDURE — 97110 THERAPEUTIC EXERCISES: CPT

## 2023-02-21 PROCEDURE — 97530 THERAPEUTIC ACTIVITIES: CPT

## 2023-02-21 PROCEDURE — 97112 NEUROMUSCULAR REEDUCATION: CPT

## 2023-02-24 ENCOUNTER — HOSPITAL ENCOUNTER (OUTPATIENT)
Dept: PHYSICAL THERAPY | Age: 23
Setting detail: THERAPIES SERIES
Discharge: HOME | End: 2023-02-24
Payer: COMMERCIAL

## 2023-02-24 PROCEDURE — 97110 THERAPEUTIC EXERCISES: CPT

## 2023-02-24 PROCEDURE — 97530 THERAPEUTIC ACTIVITIES: CPT

## 2023-02-24 PROCEDURE — 97112 NEUROMUSCULAR REEDUCATION: CPT

## 2023-02-27 NOTE — PROGRESS NOTES
[facility]) at [facility]   [address] [address]  Fax [phone]     Rebecca Jaffe PT,ATC Phone: [phone]                                      To: Dr Tiera Rivero         From: Esvin Trimble, PT,  ATC     DISCHARGE SUMMARY     Patient: Carisa Ibarra                    : 2000    Diagnosis: R HAMSTRING STRAIN     Date: 2023              Evaluation Date:  23        Total Visits to date:  6              Sport:  track, bebeto, 100 and 200  Patient Goal:  I'd like to run in conference meet , full practice   Patient History/Mechanism of Injury: running 4x200 relay on 2023 and felt hamstring pop on the turn. Pain, did not finish the race. Has been working with AT since then. Rest, ES, heat/compression. No stretching. No activity at practice. Symptoms: NO PAIN FOR >2WEEKS.     Objective/Significant Findings + Goals     24 Goals TBA prior to DC to [facility]   Post iNJURY Week   3/1=9wks post injury    Brace Wearing compression sleeve  Fittend for functional brace   Pain 2/10 No pain No pain   Gait Pain at heel strike No pain, normal walking and running gait on flat surface Normal walking/jogging gait on flat and uneven surfaces at endurance and sprint speeds   Quad Tone Excellent bilateral MET Excellent with noted VMO fully engaged   MF hamstrings prone at 45 flexion L=53.9 R=10.1 Seated, hips at 90, knee at 30 deg flexion     L=34.4  R=29.9 (%deficit)    Lateral Step Down timed NT 8in L=30  R=30 (0%deficit) 30 reps in 30 sec 8in step   Single Leg Squat  NT 21 in box L=30  R=30 (0%deficit) To 70deg x30 reps in 30sec   Proprioception SL Bosu Tossup L=2  R=1 Blue L=  R= (%deficit) X30 to ceiling, eyes on ball    SL Reach  NT L=24in  R=24in (0%deficit) 24in   SL Calf Raises 30 bilateral L=30  R=30  (%deficit) MET   SL Horizontal Hop NT L= 86 R=86  (0%deficit) Jump=height of athlete   SL double hop NT L>150  R>150 (0%deficit) = to nonsurgical extremity   SL 20yd hop for time NT L=3.95  R=4.41  (0%deficit) <6sec bilateral   SL Seated Press NT L=150#  R=150#  (0%deficit) 70% BW x10 (130#)   SL Seated Quad Extension L=130# R=50# L=170#  R=170# (0%deficit) #115/#130   SL Prone Hamstring Curl L=50#x10 R=NT L=70#  R=60#  (%deficit) 120#   Quad / Hamstring ratio L=38% L=41%  R=35%  >70%male   Overhead squat test heels down, toes fwd, thighs>parallel, stick straight up NT Goal MET    In line lunge test with stick  Goal MET      Goal Status: [] Achieved [x] Partially Achieved   Warm up with walking, cycling or slow jog so that you break a sweat before starting your running program.  Then do dynamic stretching. After completing the running drills, do your static stretching routine. Ice for 20 min after. Do not progress to the next step until the present step is painfree     Day 1  Run 1/2 speed 100yds, 10 reps (2-17-23, times 16-19sec)  Day 2  No run  Day 3  Repeat Day 1  Day 4  No run  Day 5  Repeat Day (2/21)  Day 6  Run 3/4 speed 100 yds, 10 reps  Day 7  No run  Day 8  Repeat Day 6  Day 9  No run  Day 10  Repeat Day 6  Day 11  No run  Day 12 Run 1/2 speed 110 yds x3, run 3/4 speed 100 yds x3, run full speed 50yds x4 3/1/23  Day 13  No run  Day 14 Repeat Day 12 but add one additional 50 yd sprint, progress every other day until you can do 50yds sprints at full speed x10 painfree     Assessment:  Rehab Potential:   [x] Excellent   IN OFF SEASON, WILL CONT TO WORK ON QUAD HAM RATIO, GOAL>70%  BEGIN NEG SLOPE RUNNING IN NEXT WEEK AT SUB MAX SPEED    Plan: hold off on full speed competition until completed period of FULL HEALING (maturation of regenerated myofibrils) at 12-14 WEEKS POST INJURY (MARCH 22-April 5)    Patient Status:      __X___Discharge      Electronically signed by:  Bill Bethea PT, 2/27/2023, 12:48 PM    If you have any questions or concerns, please don't hesitate to call.  Thank you for your referral.    Physician Signature:__________________________________ Date:______ Time: ________  By signing above, therapists plan is approved by physician

## 2023-02-28 ENCOUNTER — HOSPITAL ENCOUNTER (OUTPATIENT)
Dept: PHYSICAL THERAPY | Age: 23
Setting detail: THERAPIES SERIES
Discharge: HOME | End: 2023-02-28
Payer: COMMERCIAL

## 2023-02-28 PROCEDURE — 97530 THERAPEUTIC ACTIVITIES: CPT
